# Patient Record
Sex: MALE | URBAN - METROPOLITAN AREA
[De-identification: names, ages, dates, MRNs, and addresses within clinical notes are randomized per-mention and may not be internally consistent; named-entity substitution may affect disease eponyms.]

---

## 2021-08-30 ENCOUNTER — OFFICE VISIT (OUTPATIENT)
Dept: URBAN - METROPOLITAN AREA CLINIC 29 | Facility: CLINIC | Age: 46
End: 2021-08-30
Payer: COMMERCIAL

## 2021-08-30 DIAGNOSIS — H52.4 PRESBYOPIA: ICD-10-CM

## 2021-08-30 DIAGNOSIS — H11.30 SUBCONJUNCTIVAL HEMORRHAGE: Primary | ICD-10-CM

## 2021-08-30 PROCEDURE — 92004 COMPRE OPH EXAM NEW PT 1/>: CPT | Performed by: OPTOMETRIST

## 2021-08-30 NOTE — IMPRESSION/PLAN
Impression: Subconjunctival hemorrhage: H11.30. Plan: Discussed condition w/pt. Recommend cool compresses prn, use AT's prn x comfort. Condition will resolve on its own within 2-3 weeks. Recommend continued care w/PCP to address root cause, GERD. RTC prn, or in 1 year x CFM.

## 2021-08-30 NOTE — IMPRESSION/PLAN
Impression: Presbyopia: H52.4. Plan: Refractive error accounts for symptoms. Release SRX, NVO per pt preference. RTC 1 year x CEE.

## 2022-08-29 ENCOUNTER — OFFICE VISIT (OUTPATIENT)
Dept: URBAN - METROPOLITAN AREA CLINIC 22 | Facility: CLINIC | Age: 47
End: 2022-08-29
Payer: COMMERCIAL

## 2022-08-29 DIAGNOSIS — H04.123 DRY EYE SYNDROME OF BILATERAL LACRIMAL GLANDS: Primary | ICD-10-CM

## 2022-08-29 DIAGNOSIS — H52.4 PRESBYOPIA: ICD-10-CM

## 2022-08-29 PROCEDURE — 99213 OFFICE O/P EST LOW 20 MIN: CPT | Performed by: STUDENT IN AN ORGANIZED HEALTH CARE EDUCATION/TRAINING PROGRAM

## 2022-08-29 ASSESSMENT — INTRAOCULAR PRESSURE
OS: 15
OD: 16

## 2022-08-29 ASSESSMENT — VISUAL ACUITY
OS: 20/15
OD: 20/20

## 2022-08-29 NOTE — IMPRESSION/PLAN
Impression: Presbyopia: H52.4. Plan: Refractive error also accounts for patient's complaints. Recommend new glasses Rx; RTC for refraction PRN.

## 2023-08-28 ENCOUNTER — OFFICE VISIT (OUTPATIENT)
Dept: URBAN - METROPOLITAN AREA CLINIC 22 | Facility: CLINIC | Age: 48
End: 2023-08-28
Payer: COMMERCIAL

## 2023-08-28 DIAGNOSIS — H04.123 DRY EYE SYNDROME OF BILATERAL LACRIMAL GLANDS: Primary | ICD-10-CM

## 2023-08-28 DIAGNOSIS — H52.4 PRESBYOPIA: ICD-10-CM

## 2023-08-28 PROCEDURE — 92014 COMPRE OPH EXAM EST PT 1/>: CPT | Performed by: STUDENT IN AN ORGANIZED HEALTH CARE EDUCATION/TRAINING PROGRAM

## 2023-08-28 ASSESSMENT — VISUAL ACUITY
OS: 20/20
OD: 20/20

## 2023-08-28 ASSESSMENT — INTRAOCULAR PRESSURE
OD: 15
OS: 14